# Patient Record
(demographics unavailable — no encounter records)

---

## 2025-02-24 NOTE — FAMILY HISTORY
[TextEntry] : Mother breast cancer age 41 and melanoma in late 30's BRCA 2+ Maternal grandmother possible lung or breast cancer age late 30's early 40's  Maternal second cousin breast cancer age 40's BRCA 2+

## 2025-02-24 NOTE — ASSESSMENT
[FreeTextEntry1] : 30 year old BRCA 2+ female presents for high-risk screening with BRCA 2+ gene mutation and a significant family history of breast cancer in her mother at age 43.  Lifetime MINOR risk of 78.6%. Patient without complaints. Physical exam WNL.  NCCN distress management tool filled out by patient, not elevated. Most recent imaging reviewed. Plan for high risk screening MRI and re-examination in 6 months. All patients questions were answered to their satisfaction. Patient verbalizes understanding and agreement with the plan.

## 2025-02-24 NOTE — HISTORY OF PRESENT ILLNESS
[FreeTextEntry1] : 30 year old BRCA 2+ female, with Ashkenazi Yazidi ancestry, patient of Dr. Alanna Matos, who presents for high-risk screening with BRCA 2+ gene mutation and a family history of breast cancer in her mother at age 43 and MCousin age 40, both of whom are also BRCA2+. Patient denies palpable masses, nipple discharge, skin changes.   High risk B/l MRI 8/5/24, BI-RADS 2. B/l sMMG/US 2/13/25, BI-RADS 1.  Denies prior breast surgeries or biopsies. Patient reports a family history of breast cancer in her mother age 43 and BRCA2+; s/p b/l mastectomy s/p chemo s/p XRT, maternal second cousin age 40 and BRCA 2+, and Mgma with possible breast cancer age 40. Denies famhx of ovarian cancer. Reports h/o melanoma in mother before breast cancer diagnosis.  Patient had Ambry genetic testing performed 8/23/2019, revealing a BRCA 2+ gene mutation. Patient does not have children. At [facility], provided with referral to onc gyn, Dr. Watkins, for ovarian cancer screening (not performed). Also discussed options of prophylactic b/l mastectomy.   Kanwal Lifetime Risk 78.6%  Social Hx: Patient is a /briseno, recently moved from [place].

## 2025-02-24 NOTE — CONSULT LETTER
[FreeTextEntry2] : Dr. Alanna Matos [FreeTextEntry3] : David White MD Chief of Breast Surgery Director of Breast Cancer Program [facility]

## 2025-02-24 NOTE — DATA REVIEWED
[FreeTextEntry1] : 1/8/20 ([facility]) B/L MRI: extremely dense. R 4-5mm enhancing nodules at far posterior 6:00 tissues of the right breast flanking a vessel, and also showing fatty pedrito -- c/w normal intramammary lymph nodes. L axillary lymph node with a thicker cortex than others measuring 5mm, still with a fatty hilum. BI-RADS 3, probably benign. Follow Up: Left axillary US. Based on appearance of LN on US, consider either f/u imaging in 6 months, by US or MRI, or for US-guided biopsy.  10/29/20 ([facility]) B/L MRI: extremely dense. Stable appearance of left axillary lymph node, not significantly changed. Given interval stability, findings likely represent a normal variant. Follow Up: annual screening MRI due to elevated lifetime risk of breast cancer. BI-RADS 2, benign.  8/5/24 ([facility]) B/l MRI- No MRI evidence of malignancy. BI-RADS 1  2/13/25 ([facility]) B/l sMMG/US- heterogeneously dense. No mammographic or sonographic evidence of malignancy. Follow Up: Annual screening. BI-RADS 1, Negative.

## 2025-02-24 NOTE — DATA REVIEWED
[FreeTextEntry1] : 1/8/20 ([facility]) B/L MRI: extremely dense. R 4-5mm enhancing nodules at far posterior 6:00 tissues of the right breast flanking a vessel, and also showing fatty pedrito -- c/w normal intramammary lymph nodes. L axillary lymph node with a thicker cortex than others measuring 5mm, still with a fatty hilum. BI-RADS 3, probably benign. Follow Up: Left axillary US. Based on appearance of LN on US, consider either f/u imaging in 6 months, by US or MRI, or for US-guided biopsy.  10/29/20 ([facility]) B/L MRI: extremely dense. Stable appearance of left axillary lymph node, not significantly changed. Given interval stability, findings likely represent a normal variant. Follow Up: annual screening MRI due to elevated lifetime risk of breast cancer. BI-RADS 2, benign.  8/5/24 ([facility]) B/l MRI- No MRI evidence of malignancy. BI-RADS 1  2/13/25 ([facility]) B/l sMMG/US- heterogeneously dense. No mammographic or sonographic evidence of malignancy. Follow Up: Annual screening. BI-RADS 1, Negative.

## 2025-02-24 NOTE — CONSULT LETTER
[FreeTextEntry2] : Dr. Alanna Matos [FreeTextEntry3] : David White MD Chief of Breast Surgery Director of Breast Cancer Program [facility]

## 2025-02-24 NOTE — CONSULT LETTER
[FreeTextEntry2] : Dr. Alanna Matos [FreeTextEntry3] : David White MD Chief of Breast Surgery Director of Breast Cancer Program [facility]

## 2025-02-24 NOTE — PAST MEDICAL HISTORY
[History of Hormone Replacement Treatment] : has no history of hormone replacement treatment [FreeTextEntry6] : No [FreeTextEntry7] : Yes, pills in the past [FreeTextEntry8] : N/A

## 2025-02-24 NOTE — HISTORY OF PRESENT ILLNESS
[FreeTextEntry1] : 30 year old BRCA 2+ female, with Ashkenazi Sabianist ancestry, patient of Dr. Alanna Matos, who presents for high-risk screening with BRCA 2+ gene mutation and a family history of breast cancer in her mother at age 43 and MCousin age 40, both of whom are also BRCA2+. Patient denies palpable masses, nipple discharge, skin changes.   High risk B/l MRI 8/5/24, BI-RADS 2. B/l sMMG/US 2/13/25, BI-RADS 1.  Denies prior breast surgeries or biopsies. Patient reports a family history of breast cancer in her mother age 43 and BRCA2+; s/p b/l mastectomy s/p chemo s/p XRT, maternal second cousin age 40 and BRCA 2+, and Mgma with possible breast cancer age 40. Denies famhx of ovarian cancer. Reports h/o melanoma in mother before breast cancer diagnosis.  Patient had Ambry genetic testing performed 8/23/2019, revealing a BRCA 2+ gene mutation. Patient does not have children. At [facility], provided with referral to onc gyn, Dr. Watkins, for ovarian cancer screening (not performed). Also discussed options of prophylactic b/l mastectomy.   Kanwal Lifetime Risk 78.6%  Social Hx: Patient is a /briseno, recently moved from [place].

## 2025-02-24 NOTE — HISTORY OF PRESENT ILLNESS
[FreeTextEntry1] : 30 year old BRCA 2+ female, with Ashkenazi Confucianism ancestry, patient of Dr. Alanna Matos, who presents for high-risk screening with BRCA 2+ gene mutation and a family history of breast cancer in her mother at age 43 and MCousin age 40, both of whom are also BRCA2+. Patient denies palpable masses, nipple discharge, skin changes.   High risk B/l MRI 8/5/24, BI-RADS 2. B/l sMMG/US 2/13/25, BI-RADS 1.  Denies prior breast surgeries or biopsies. Patient reports a family history of breast cancer in her mother age 43 and BRCA2+; s/p b/l mastectomy s/p chemo s/p XRT, maternal second cousin age 40 and BRCA 2+, and Mgma with possible breast cancer age 40. Denies famhx of ovarian cancer. Reports h/o melanoma in mother before breast cancer diagnosis.  Patient had Ambry genetic testing performed 8/23/2019, revealing a BRCA 2+ gene mutation. Patient does not have children. At [facility], provided with referral to onc gyn, Dr. Watkins, for ovarian cancer screening (not performed). Also discussed options of prophylactic b/l mastectomy.   Kanwal Lifetime Risk 78.6%  Social Hx: Patient is a /briseno, recently moved from [place].